# Patient Record
Sex: FEMALE | HISPANIC OR LATINO
[De-identification: names, ages, dates, MRNs, and addresses within clinical notes are randomized per-mention and may not be internally consistent; named-entity substitution may affect disease eponyms.]

---

## 2020-08-19 PROBLEM — Z00.00 ENCOUNTER FOR PREVENTIVE HEALTH EXAMINATION: Status: ACTIVE | Noted: 2020-08-19

## 2020-10-05 ENCOUNTER — APPOINTMENT (OUTPATIENT)
Dept: RHEUMATOLOGY | Facility: CLINIC | Age: 65
End: 2020-10-05
Payer: COMMERCIAL

## 2020-10-05 VITALS
OXYGEN SATURATION: 98 % | HEIGHT: 60 IN | WEIGHT: 192 LBS | HEART RATE: 81 BPM | SYSTOLIC BLOOD PRESSURE: 110 MMHG | TEMPERATURE: 98.3 F | BODY MASS INDEX: 37.69 KG/M2 | DIASTOLIC BLOOD PRESSURE: 70 MMHG | RESPIRATION RATE: 16 BRPM

## 2020-10-05 DIAGNOSIS — M17.31 UNILATERAL POST-TRAUMATIC OSTEOARTHRITIS, RIGHT KNEE: ICD-10-CM

## 2020-10-05 DIAGNOSIS — Z83.3 FAMILY HISTORY OF DIABETES MELLITUS: ICD-10-CM

## 2020-10-05 DIAGNOSIS — M17.10 UNILATERAL PRIMARY OSTEOARTHRITIS, UNSPECIFIED KNEE: ICD-10-CM

## 2020-10-05 PROCEDURE — 99204 OFFICE O/P NEW MOD 45 MIN: CPT

## 2020-10-05 RX ORDER — MELOXICAM 15 MG/1
15 TABLET ORAL
Qty: 90 | Refills: 0 | Status: ACTIVE | COMMUNITY
Start: 2020-05-29

## 2020-10-05 RX ORDER — PREDNISONE 5 MG/1
5 TABLET ORAL
Qty: 30 | Refills: 0 | Status: DISCONTINUED | COMMUNITY
Start: 2020-06-06

## 2020-10-05 RX ORDER — GABAPENTIN 100 MG/1
100 CAPSULE ORAL
Qty: 180 | Refills: 0 | Status: ACTIVE | COMMUNITY
Start: 2020-05-29

## 2020-10-05 RX ORDER — ATORVASTATIN CALCIUM 20 MG/1
20 TABLET, FILM COATED ORAL
Qty: 30 | Refills: 0 | Status: ACTIVE | COMMUNITY
Start: 2020-06-06

## 2020-10-06 NOTE — HISTORY OF PRESENT ILLNESS
[FreeTextEntry1] : 66 yo woman with morbid obesity present for second opinion of right knee pain.  patient has right knee pain for 1 year.  saw ortho and was told she needs surgery.  she had right knee xray and MRI done with ion the past year.  No steroid injection and PT.  patient told him she needed to have a second opinion.   patient has knee pain when she extends her knees.  she can walk with no pain.  No knee pain on standing.  using stairs causes knee pain.  takes tylenol with no pain relieve. she was given gabapentin and mobic by her pmd for pain. seems like she is not taking mobic.  now she is starting to have left knee pain too.  Per patient the right knee gets swollen.  \par \par patient recalls a remote accident where her left foot ended in a hole and states that she started to have knee pain since. \par \par \par  [Fever] : no fever [Chills] : no chills [Skin Lesions] : no lesions [Skin Nodules] : no skin nodules [Cough] : no cough [Shortness of Breath] : no shortness of breath [Chest Pain] : no chest pain [Dyspnea] : no dyspnea [Eye Pain] : no eye pain [Eye Redness] : no eye redness [Dry Eyes] : no dry eyes

## 2020-10-06 NOTE — ASSESSMENT
[FreeTextEntry1] : patient with knee OA here for second opinion on surgical intervention  \par \par --patient to obtain results of xray and mri of the right knee ( office is contacting DR Preciado) \par --i have suggested injection of the knee and PT \par --wt loss encouraged.  offered her bariatic surgery consultation however patient declines surgery to loss wt.  patient is to discuss with PMD a referral to nutritionist \par --start PT \par --start using mobic for pain

## 2020-10-06 NOTE — REVIEW OF SYSTEMS
[Fever] : no fever [Chills] : no chills [Eye Pain] : no eye pain [Nosebleeds] : no nosebleeds [Chest Pain] : no chest pain [Palpitations] : no palpitations [Cough] : no cough [SOB on Exertion] : no shortness of breath during exertion [Diarrhea] : no diarrhea [Melena] : no melena [Joint Swelling] : no joint swelling [Joint Stiffness] : no joint stiffness [Skin Lesions] : no skin lesions [Skin Wound] : no skin wound [FreeTextEntry9] : right knee pain on full flexion, no effusion.  very obese legs which make exam difficulty.  no hand wrist elbow or shoulderpain .  FROM throughout.  patient with no evidence of synovitis

## 2020-10-06 NOTE — PHYSICAL EXAM
[General Appearance - Well Nourished] : well nourished [General Appearance - Well Developed] : well developed [Sclera] : the sclera and conjunctiva were normal [Hearing Threshold Finger Rub Not [place]] : hearing was normal [Neck Cervical Mass (___cm)] : no neck mass was observed [Auscultation Breath Sounds / Voice Sounds] : lungs were clear to auscultation bilaterally [Heart Sounds] : normal S1 and S2 [] : no rash [Skin Lesions] : no skin lesions [FreeTextEntry1] : right knee with pain on full flexion,no effusion or warmth to the knees  hips normal , anlkle normal, hands/wrist/elbow and shoulder FROM

## 2020-10-19 ENCOUNTER — APPOINTMENT (OUTPATIENT)
Dept: RHEUMATOLOGY | Facility: CLINIC | Age: 65
End: 2020-10-19

## 2020-11-23 ENCOUNTER — APPOINTMENT (OUTPATIENT)
Dept: RHEUMATOLOGY | Facility: CLINIC | Age: 65
End: 2020-11-23